# Patient Record
Sex: MALE | HISPANIC OR LATINO | URBAN - METROPOLITAN AREA
[De-identification: names, ages, dates, MRNs, and addresses within clinical notes are randomized per-mention and may not be internally consistent; named-entity substitution may affect disease eponyms.]

---

## 2018-10-01 ENCOUNTER — EMERGENCY (EMERGENCY)
Facility: HOSPITAL | Age: 33
LOS: 1 days | Discharge: LEFT BEFORE DISCHARGE | End: 2018-10-01
Admitting: EMERGENCY MEDICINE

## 2018-10-01 VITALS
OXYGEN SATURATION: 99 % | HEART RATE: 79 BPM | DIASTOLIC BLOOD PRESSURE: 99 MMHG | RESPIRATION RATE: 16 BRPM | SYSTOLIC BLOOD PRESSURE: 140 MMHG | TEMPERATURE: 98 F

## 2018-10-01 NOTE — ED ADULT TRIAGE NOTE - CHIEF COMPLAINT QUOTE
Pt c/o penile pain/dysuria since Wednesday.  Pt saw PMD and was given antibiotics with no relief.  Pt denies hematuria.  Pt also endorses "cuts" on outside of penis.

## 2021-12-10 ENCOUNTER — EMERGENCY (EMERGENCY)
Facility: HOSPITAL | Age: 36
LOS: 1 days | Discharge: HOME | End: 2021-12-10
Attending: HOSPITALIST | Admitting: HOSPITALIST
Payer: COMMERCIAL

## 2021-12-10 VITALS
HEART RATE: 92 BPM | TEMPERATURE: 97 F | DIASTOLIC BLOOD PRESSURE: 83 MMHG | RESPIRATION RATE: 16 BRPM | SYSTOLIC BLOOD PRESSURE: 126 MMHG | OXYGEN SATURATION: 100 %

## 2021-12-10 PROCEDURE — 99283 EMERGENCY DEPT VISIT LOW MDM: CPT

## 2021-12-10 NOTE — ED ADULT TRIAGE NOTE - CHIEF COMPLAINT QUOTE
c/o right eye pain with clear drainage since last night. Denies any change in vision. Denies any PMH

## 2021-12-10 NOTE — ED PROVIDER NOTE - PATIENT PORTAL LINK FT
You can access the FollowMyHealth Patient Portal offered by [facility] by registering at the following website: [email or web address]. By joining [facility]’s FollowMyHealth portal, you will also be able to view your health information using other applications (apps) compatible with our system.

## 2021-12-10 NOTE — ED PROVIDER NOTE - OBJECTIVE STATEMENT
37 y/o male no pmh presents to ER c/o bl eye redness R>L and right eye pain x 2 days. Pt. states yesterday evening developed right eye pain - went to sleep - woke up this mornign with bl eye redness and discahrge but right eye pain - states gritty sensation and tearing. went to PMD who sent pt to er for further evaluation. Denies blurry vision photophobia headache fever chills.

## 2021-12-10 NOTE — ED PROVIDER NOTE - NSFOLLOWUPINSTRUCTIONS_ED_ALL_ED_FT
PLEASE TAKE ANTIBIOTIC DROPS AS DIRECTED  1 DROP TO AFFECTED EYES 4 TIMES A DAY    PLEASE FOLLOW UP WITH [facility] AS SOON AS POSSIBLE    [facility]  [address]  [facility]  [phone]      Conjunctivitis    WHAT YOU NEED TO KNOW:  Conjunctivitis, or pink eye, is inflammation of your conjunctiva. The conjunctiva is a thin tissue that covers the front of your eye and the back of your eyelids. The conjunctiva helps protect your eye and keep it moist. Conjunctivitis may be caused by bacteria, allergies, or a virus. If your conjunctivitis is caused by bacteria, it may get better on its own in about 7 days. Viral conjunctivitis can last up to 3 weeks.     DISCHARGE INSTRUCTIONS:    Return to the emergency department if:   •You have worsening eye pain.   •The swelling in your eye gets worse, even after treatment.   •Your vision suddenly becomes worse or you cannot see at all.      Contact your healthcare provider if:   •You develop a fever and ear pain.  •You have tiny bumps or spots of blood on your eye.  •You have questions or concerns about your condition or care.      Manage your symptoms:   •Apply a cool compress. Wet a washcloth with cold water and place it on your eye. This will help decrease itching and irritation.  •Do not wear contact lenses. They can irritate your eye. Throw away the pair you are using and ask when you can wear them again. Use a new pair of lenses when your healthcare provider says it is okay.   •Avoid irritants. Stay away from smoke filled areas. Shield your eyes from wind and sun.   •Flush your eye. You may need to flush your eye with saline to help decrease your symptoms. Ask for more information on how to flush your eye.       Medicines: Treatment depends on what is causing your conjunctivitis. You maybe given any of the following:  •Allergy medicine helps decrease itchy, red, swollen eyes caused by allergies. It may be given as a pill, eye drops, or nasal spray.  •Antibiotics may be needed if your conjunctivitis is caused by bacteria. This medicine may be given as a pill, eye drops, or eye ointment.  •Take your medicine as directed. Contact your healthcare provider if you think your medicine is not helping or if you have side effects. Tell him or her if you are allergic to any medicine. Keep a list of the medicines, vitamins, and herbs you take. Include the amounts, and when and why you take them. Bring the list or the pill bottles to follow-up visits. Carry your medicine list with you in case of an emergency.      Prevent the spread of conjunctivitis:   •Wash your hands with soap and water often. Wash your hands before and after you touch your eyes. Also wash your hands before you prepare or eat food and after you use the bathroom or change a diaper.  •Avoid allergens. Try to avoid the things that cause your allergies, such as pets, dust, or grass.   •Avoid contact with others. Do not share towels or washcloths. Try to stay away from others as much as possible. Ask when you can return to work or school.   •Throw away eye makeup. The bacteria that caused your conjunctivitis can stay in eye makeup. Throw away mascara and other eye makeup.    Conjuntivitis    LO QUE NECESITA SABER:  La conjuntivitis es la inflamación de la conjuntiva. La conjuntiva es el tejido gomez que cubre la parte frontal de stone wendi y la parte posterior de addis párpados. La conjuntiva ayuda a proteger stone wendi y a mantenerlo húmedo. La conjuntivitis podría ser a causa de godfrey bacteria, alergias o de un virus. Si stone conjuntivitis es a causa de godfrey bacteria, podría mejorar por sí radha en aproximadamente 7 rhonda. La conjuntivitis viral puede durar hasta 3 semanas.    INSTRUCCIONES SOBRE EL ALEX HOSPITALARIA:    Regrese a la nakul de emergencias si:  •Stone dolor de wendi empeora.  •La inflamación en addis ojos empeora, aún después del tratamiento.  •Stone visión empeora repentinamente o usted no puede juan diego en lo absoluto.      Comuníquese con stone médico si:  •Usted presenta fiebre o dolor de oído.  •Usted tiene bultos o manchas de abigail pequeñas en stone wendi.  •Usted tiene preguntas o inquietudes acerca de stone condición o cuidado.  El manejo de addis síntomas:  •Aplique godfrey compresa fría.Moje godfrey toalla con agua fría y colóquela sobre stone wendi. [place] ayuda a disminuir la comezón e irritación.  •No use lentes de contacto.Ellos podrían irritar addis ojos. Deseche el par que está usando y pregunte cuándo puede usarlos otra vez. Use un par de lentes nuevos cuando stone médico lo autorice.  •Evite los irritantes.Aléjese de las áreas llenas de humo. Proteja addis ojos del aire y del sol.  •Enjuague stone wendi.Es posible que necesite enjuagar stone wendi con solución salina para ayudar a disminuir addis síntomas. Pida más información sobre cómo enjuagar stone wendi.  Medicamentos:El tratamiento depende de lo que está provocando la conjuntivitis. A usted  podrían  darle alguno de lo siguientes:  •Medicamentos para la alergiaayuda a disminuir la comezón, el enrojecimiento y la inflamación de addis ojos a causa de las alergias. Se lo podrían desiree en forma de píldora, gotas para los ojos o atomizador nasal.  •Los antibióticospodrían ser necesarios si stone conjuntivitis es a causa de godfrey bacteria. [place] medicamento podría ser en forma de píldora, gotas o pomada para los ojos.      •[place] addis medicamentos nikhil se le haya indicado.Consulte con stone médico si usted lucy que stone medicamento no le está ayudando o si presenta efectos secundarios. Infórmele si es alérgico a cualquier medicamento. Mantenga godfrey lista actualizada de los medicamentos, las vitaminas y los productos herbales que ruby. Incluya los siguientes datos de los medicamentos: cantidad, frecuencia y motivo de administración. Traiga con usted la lista o los envases de las píldoras a addis citas de seguimiento. Lleve la lista de los medicamentos con usted en karin de godfrey emergencia.      Evite la propagación de la conjuntivitis:  •Lávese addis jon con jabón y agua con frecuencia.Lávese las jon antes y después de tocarse los ojos. También lávese addis jon antes de preparar o comer alimentos y después de usar el baño o cambiar un pañal.      •Evite los alérgenos.Trate de evitar las cosas que le provoquen alergias, nikhil las mascotas, polvo o pasto.      •Evite el contacto con otras personas.No comparta las toallas o paños. Trate de permanecer lejos de otras personas tanto nikhil sea posible. Pregunte cuándo puede regresar al trabajo o a clase.      •Deseche el maquillaje de ojos.La bacteria que provoca la conjuntivitis puede estar en el maquillaje de ojos. Deseche el rimel y otros maquillajes de ojos.

## 2021-12-10 NOTE — ED PROVIDER NOTE - CLINICAL SUMMARY MEDICAL DECISION MAKING FREE TEXT BOX
35 y/o male w/ right eye redness pain x 2 days  -likely conjunctivitis, low suspicion for iritis/uveitis  -comprehensive eye exam  -polytrim gtt  -optho clinic follow up asap

## 2021-12-10 NOTE — ED PROVIDER NOTE - ATTENDING CONTRIBUTION TO CARE
36M with no PMhx p/w b/l eye redness R>L with right eye pain/itch for the past 2 days. patient saw his doctor t\who sent him to the ED for evaluation. no blurry vision but does have a gritty sensation to eye. no eye trauma. no similar sx in the past.    ***GEN - NAD; well appearing; A+O x3 ***HEAD - NC/AT ***EYES/NOSE - PERRL,+ redness to b/l conjunctiva (R>L), EOMI, mucous membranes moist, no discharge ***THROAT: Oral cavity and pharynx normal. No inflammation, swelling, exudate, or lesions.  ***NECK: Neck supple, non-tender without lymphadenopathy, no masses, no thyromegaly.   ***PULMONARY - CTA b/l, symmetric breath sounds. ***CARDIAC -s1s2, RRR, no M,G,R  ***ABDOMEN - +BS, ND, NT, soft, no guarding, no rebound, no masses   ***BACK - no CVA tenderness, Normal  spine ***EXTREMITIES - symmetric pulses, 2+ dp, capillary refill < 2 seconds, no clubbing, no cyanosis, no edema ***SKIN - no rash or bruising   ***NEUROLOGIC - alert, CN 2-12 intact    MDM: 36M with conjunctivitis. will eval under slit lamp.